# Patient Record
(demographics unavailable — no encounter records)

---

## 2024-10-16 NOTE — ASSESSMENT
[FreeTextEntry1] : Acute Visit: Fibromyalgia syndrome, Arthralgia  BP is stable. Continue current management. - Check A1c. Lipid panel, vitamin levels, urine analysis, TSH, Immunoelectrophoretic serum, Rheumatoid Factor, Protein Elect Urn, Interp, Reflx, CCP,  - Renew Pregabalin 50mg,-Discussed potential side effects including increased somnolence.  Flu vaccine- administered.  - RTO in 3 months     Pt verbalized understanding and will reach should any questions/concerns occur.

## 2024-10-16 NOTE — PHYSICAL EXAM
[Normal] : soft, non-tender, non-distended, no masses palpated, no HSM and normal bowel sounds [de-identified] : Multiple tender points throughout body, Pain in the right greater trochanter tenderness on internal rotation of the hip

## 2024-10-16 NOTE — HISTORY OF PRESENT ILLNESS
[FreeTextEntry8] : Pt is a 76 yr old female who is present today for an acute visit.   Pt c/o of pain all over body (particularly top of back, left ankle, rotator cuff), a/w fibromyalgia. Pt has arthritis and previously seen rheumatologist. Reports MRI with Dr. Roman, prior to epidural injection. Pt reports losing 7 lbs yet reports needing to lose 50 lbs in order for left ankle reconstruction. Pt reports of right groin pain.  Pt reports taking Cymbalta. Discussed Lyrica, end cyclobenzaprine.  Flu vaccine, oral consent obtained, was administered. Discussed RSV at pharmacy.  Denies any CP, chest tightness or SOB. Denies any abdominal pain, urinary symptom, or change in bowel habits. Denies any fever, chills, or night sweats.

## 2024-10-16 NOTE — ADDENDUM
[FreeTextEntry1] : I, Anthony Soliz, acted as a scribe on behalf of Dr. Benoit Reyes MD, on 10/16/2024.   All medical entries made by the scribe were at my, Dr. Benoit Reyes MD, direction and personally dictated by me on 10/16/2024. I have reviewed the chart and agree that the record accurately reflects my personal performance of the history, physical exam, assessment and plan. I have also personally directed, reviewed, and agreed with the chart.

## 2024-10-16 NOTE — REVIEW OF SYSTEMS
[Negative] : Heme/Lymph [Joint Pain] : joint pain [Muscle Pain] : muscle pain [FreeTextEntry9] : Body aches a/w fibromyalgia

## 2024-12-04 NOTE — HISTORY OF PRESENT ILLNESS
[FreeTextEntry8] : Patient presents to the office due to rotator cuff tear he does have difficulty raising shoulder has difficulty raising shoulder shoulder on the forward and lateral side, will also be following with pain management for ablation of cervical stenosis.  Denies any trauma. Has lost weight on Mounjaro denies any side effects agreeable to go up on dose.

## 2024-12-04 NOTE — PHYSICAL EXAM
[Normal] : no acute distress, well nourished, well developed and well-appearing [de-identified] : Difficulty with abduction forward flexion of the shoulder pain on internal rotation

## 2024-12-04 NOTE — ASSESSMENT
[FreeTextEntry1] : Possible tendinopathy impingement,  Did advise PT name of sports medicine provided for further recommendations To increase Mounjaro patient denies any side effects. Patient presents for longitudinal management

## 2024-12-10 NOTE — PHYSICAL EXAM
[de-identified] : Constitutional: Well-nourished, well-developed, No acute distress Respiratory:  Good respiratory effort, no SOB Lymphatic: No regional lymphadenopathy, no lymphedema Psychiatric: Pleasant and normal affect, alert and oriented x3 Skin: Clean dry and intact B/L UE/LE Musculoskeletal: normal except where as noted in regional exam   [default value] Shoulder: APPEARANCE: no marked deformities, no swelling or malalignment POSITIVE TENDERNESS: supraspinatus, infraspinatus, teres minor, LH biceps, anterior and posterior capsule,  NONTENDER: AC joint ROM: limited in all directions due to pain SPECIAL TESTS: +Drop Arm, + Empty Can, +Simmons/Neers, neg Kelley's, neg Speeds, neg Apprehension, neg cross arm adduction, neg apley's scratch test Vasc: 2+ radial pulse Neuro: AIN, PIN, Ulnar nerve intact to motor, DTRs 2+/4 biceps, triceps, brachioradialis Sensation: Intact to light touch throughout B/L Elbows:  No asymmetry, malalignment, or swelling, Full ROM, 5/5 strength in flexion/ext, pronation/supination, Joints stable B/L Wrist and Hand:  No asymmetry, malalignment, or swelling, Full ROM, 5/5 strength in wrist and long finger flexion/ext, radial/ulnar deviation, Joints stable [de-identified] : The following radiographs were ordered and read by me during this patient's visit. I reviewed each radiograph in detail with the patient and discussed the findings as highlighted below.   3 views of the left shoulder were obtained today that show glenohumeral and AC joint arthrosis.

## 2024-12-10 NOTE — PHYSICAL EXAM
[de-identified] : Constitutional: Well-nourished, well-developed, No acute distress Respiratory:  Good respiratory effort, no SOB Lymphatic: No regional lymphadenopathy, no lymphedema Psychiatric: Pleasant and normal affect, alert and oriented x3 Skin: Clean dry and intact B/L UE/LE Musculoskeletal: normal except where as noted in regional exam   [default value] Shoulder: APPEARANCE: no marked deformities, no swelling or malalignment POSITIVE TENDERNESS: supraspinatus, infraspinatus, teres minor, LH biceps, anterior and posterior capsule,  NONTENDER: AC joint ROM: limited in all directions due to pain SPECIAL TESTS: +Drop Arm, + Empty Can, +Simmons/Neers, neg Kelley's, neg Speeds, neg Apprehension, neg cross arm adduction, neg apley's scratch test Vasc: 2+ radial pulse Neuro: AIN, PIN, Ulnar nerve intact to motor, DTRs 2+/4 biceps, triceps, brachioradialis Sensation: Intact to light touch throughout B/L Elbows:  No asymmetry, malalignment, or swelling, Full ROM, 5/5 strength in flexion/ext, pronation/supination, Joints stable B/L Wrist and Hand:  No asymmetry, malalignment, or swelling, Full ROM, 5/5 strength in wrist and long finger flexion/ext, radial/ulnar deviation, Joints stable [de-identified] : The following radiographs were ordered and read by me during this patient's visit. I reviewed each radiograph in detail with the patient and discussed the findings as highlighted below.   3 views of the left shoulder were obtained today that show glenohumeral and AC joint arthrosis.

## 2024-12-10 NOTE — HISTORY OF PRESENT ILLNESS
[de-identified] : MALKA   is a 76 year F who presents with left shoulder pain.  Pain is primarily located at the posterior shoulder.  It began at least 5 years ago. She notes pain was exacerbated 6 months ago after lifting something heavy. Pain is described as throbbing in nature and limited ROM. Notes history of cervical spine stenosis, states she has had c-spine epidurals within the last few weeks. Denies numbness, tingling, weakness of the upper extremities.

## 2024-12-10 NOTE — HISTORY OF PRESENT ILLNESS
[de-identified] : MALKA   is a 76 year F who presents with left shoulder pain.  Pain is primarily located at the posterior shoulder.  It began at least 5 years ago. She notes pain was exacerbated 6 months ago after lifting something heavy. Pain is described as throbbing in nature and limited ROM. Notes history of cervical spine stenosis, states she has had c-spine epidurals within the last few weeks. Denies numbness, tingling, weakness of the upper extremities.

## 2024-12-10 NOTE — DISCUSSION/SUMMARY
[de-identified] : Radha presents with diffuse shoulder pain. Educated patient on their most probable diagnosis of rotator cuff tendinopathy and glenohumeral joint osteoarthritis.   Subacromial steroid injection performed today. Patient tolerated the procedure well. I informed the patient that the numbing medicine in today's injection will last for about 4-6 hours. The steroid that was injected will start to work in 1 to 2 days, peak at 1-2 weeks, and may last up to 4-6 weeks.  Pain may worsen over the next 72 hours. Patient was also informed they may notice local skin depigmentation, which is normal.   Referral to physical therapy provided. Follow up as needed.  Keri Brown MD, [facility] Department of Orthopedics   IRosita, assisted with documentation on 12/10/2024 acting as scribe for Dr. Keri Brown.   I, Keri Brown MD, EdMARLO, personally performed the services described in the documentation, reviewed the documentation recorded by the scribe in my presence and it accurately and completely records my words and actions.

## 2024-12-10 NOTE — PROCEDURE
[de-identified] : Injection: LEFT Shoulder Subacromial Space. Indication: Impingement. A discussion was had with the patient regarding this procedure and all questions were answered. All risks, benefits and alternatives were discussed. These included but were not limited to bleeding, infection, and allergic reaction.  A timeout was done to ensure correct side and pt agreed to the procedure.   Chlorhexidine was used to clean and sterilize and prep the area in the posterior aspect of the shoulder. A 22-gauge 1.5" needle was used to inject 4cc of 1% lidocaine without epinephrine and 1cc of 40mg/ml methylprednisolone into the subacromial space. A sterile bandage was then applied. The patient tolerated the procedure well and there were no complications.

## 2024-12-10 NOTE — PROCEDURE
[de-identified] : Injection: LEFT Shoulder Subacromial Space. Indication: Impingement. A discussion was had with the patient regarding this procedure and all questions were answered. All risks, benefits and alternatives were discussed. These included but were not limited to bleeding, infection, and allergic reaction.  A timeout was done to ensure correct side and pt agreed to the procedure.   Chlorhexidine was used to clean and sterilize and prep the area in the posterior aspect of the shoulder. A 22-gauge 1.5" needle was used to inject 4cc of 1% lidocaine without epinephrine and 1cc of 40mg/ml methylprednisolone into the subacromial space. A sterile bandage was then applied. The patient tolerated the procedure well and there were no complications.

## 2024-12-10 NOTE — DISCUSSION/SUMMARY
[de-identified] : Radha presents with diffuse shoulder pain. Educated patient on their most probable diagnosis of rotator cuff tendinopathy and glenohumeral joint osteoarthritis.   Subacromial steroid injection performed today. Patient tolerated the procedure well. I informed the patient that the numbing medicine in today's injection will last for about 4-6 hours. The steroid that was injected will start to work in 1 to 2 days, peak at 1-2 weeks, and may last up to 4-6 weeks.  Pain may worsen over the next 72 hours. Patient was also informed they may notice local skin depigmentation, which is normal.   Referral to physical therapy provided. Follow up as needed.  Keri Brown MD, [facility] Department of Orthopedics   IRosita, assisted with documentation on 12/10/2024 acting as scribe for Dr. Keri Brown.   I, Keri Brown MD, EdMARLO, personally performed the services described in the documentation, reviewed the documentation recorded by the scribe in my presence and it accurately and completely records my words and actions.

## 2024-12-12 NOTE — PHYSICAL EXAM
[2+] : left foot dorsalis pedis 2+ [Skin Turgor] : normal skin turgor [Skin Lesions] : no skin lesions [Sensation] : the sensory exam was normal to light touch and pinprick [No Focal Deficits] : no focal deficits [Deep Tendon Reflexes (DTR)] : deep tendon reflexes were 2+ and symmetric [Motor Exam] : the motor exam was normal [General Appearance - Alert] : alert [Oriented To Time, Place, And Person] : oriented to person, place, and time [Ankle Swelling (On Exam)] : not present [Varicose Veins Of Lower Extremities] : not present [] : not present [Delayed in the Right Toes] : capillary refills normal in right toes [Delayed in the Left Toes] : capillary refills normal in the left toes [de-identified] : There is collapse of the medial, longitudinal arch of the left foot.  Patient has a posterior tibial tendon dysfunction and progressive collapsing foot deformity on the left that is semi-rigid with abduction of the forefoot and forefoot varus as well as heel valgus.   No crepitation on ROM.  There is sinus tarsi pain.  She is wearing a brace.  Patient wants to consider surgery for this in the future and when she decides, she will come back for evaluation and x-rays.   [Foot Ulcer] : no foot ulcer [Skin Induration] : no skin induration [FreeTextEntry1] : Paronychia, posterior, medial nailbed, right hallux nail.

## 2024-12-12 NOTE — PHYSICAL EXAM
[2+] : left foot dorsalis pedis 2+ [Skin Turgor] : normal skin turgor [Skin Lesions] : no skin lesions [Sensation] : the sensory exam was normal to light touch and pinprick [No Focal Deficits] : no focal deficits [Deep Tendon Reflexes (DTR)] : deep tendon reflexes were 2+ and symmetric [Motor Exam] : the motor exam was normal [General Appearance - Alert] : alert [Oriented To Time, Place, And Person] : oriented to person, place, and time [Ankle Swelling (On Exam)] : not present [Varicose Veins Of Lower Extremities] : not present [] : not present [Delayed in the Right Toes] : capillary refills normal in right toes [Delayed in the Left Toes] : capillary refills normal in the left toes [de-identified] : There is collapse of the medial, longitudinal arch of the left foot.  Patient has a posterior tibial tendon dysfunction and progressive collapsing foot deformity on the left that is semi-rigid with abduction of the forefoot and forefoot varus as well as heel valgus.   No crepitation on ROM.  There is sinus tarsi pain.  She is wearing a brace.  Patient wants to consider surgery for this in the future and when she decides, she will come back for evaluation and x-rays.   [Foot Ulcer] : no foot ulcer [Skin Induration] : no skin induration [FreeTextEntry1] : Paronychia, posterior, medial nailbed, right hallux nail.

## 2024-12-12 NOTE — ASSESSMENT
[FreeTextEntry1] : Impression: Paronychia, right hallux (L03.031).  Posterior tibial tendonitis/dysfunction left lower extremity (M76.822) (M76.829).  Treatment: Patient presented for care of the paronychia today and was given Augmentin.  Patient was given soaking instructions.  Explained to the patient that because this is at the posterior nail fold, it is more difficult to treat and I would have to surgically remove the nail.  Right now, will attempt antibiotics and soaking.  Any questions or problems, patient is to contact the office.

## 2024-12-12 NOTE — CONSULT LETTER
[Dear  ___] : Dear  [unfilled], [Courtesy Letter:] : I had the pleasure of seeing your patient, [unfilled], in my office today. [Please see my note below.] : Please see my note below. [Consult Closing:] : Thank you very much for allowing me to participate in the care of this patient.  If you have any questions, please do not hesitate to contact me. [Sincerely,] : Sincerely, [FreeTextEntry2] : Benoit Reyes MD [address] [facility] [address] [place] [id number]  [FreeTextEntry3] : Charles M. Lombardi, DPM, FACF[employer] Systems Chief, Podiatric Services [facility] Assistant Professor of Surgery [facility] [employer] at [facility]

## 2024-12-12 NOTE — CONSULT LETTER
[Dear  ___] : Dear  [unfilled], [Courtesy Letter:] : I had the pleasure of seeing your patient, [unfilled], in my office today. [Please see my note below.] : Please see my note below. [Consult Closing:] : Thank you very much for allowing me to participate in the care of this patient.  If you have any questions, please do not hesitate to contact me. [Sincerely,] : Sincerely, [FreeTextEntry2] : Benoit Reyes MD [address] [facility] [address] [place] [phone]  [FreeTextEntry3] : Charles M. Lombardi, DPM, FACF[employer] Systems Chief, Podiatric Services [facility] Assistant Professor of Surgery [facility] [employer] at [facility]

## 2024-12-12 NOTE — HISTORY OF PRESENT ILLNESS
[FreeTextEntry1] : Patient presents today with paronychia to the right hallux nail with local erythema along the posterior nail fold.  She recently had a pedicure and developed a paronychia.  She is here for evaluation.  No new changes to her medical status.

## 2024-12-12 NOTE — CONSULT LETTER
[Dear  ___] : Dear  [unfilled], [Courtesy Letter:] : I had the pleasure of seeing your patient, [unfilled], in my office today. [Please see my note below.] : Please see my note below. [Consult Closing:] : Thank you very much for allowing me to participate in the care of this patient.  If you have any questions, please do not hesitate to contact me. [Sincerely,] : Sincerely, [FreeTextEntry2] : Benoit Reyes MD [address] [facility] [address] [place] [id number]  [FreeTextEntry3] : Charles M. Lombardi, DPM, FAC[employer] Chief, Podiatric Services [facility] Assistant Professor of Surgery [facility] [employer] at [facility]

## 2025-01-08 NOTE — PROCEDURE
[Partial Nail Avulsion] : partial nail avulsion on [1] : toe 1 [Medial Border] : medial nail border [Right Foot] : was performed on the right foot [Patient] : the patient [Risks] : risks [Benefits] : benefits [Alternatives] : alternatives [1%] : 1%  [Alcohol] : alcohol [27 gauge] : A 27 gauge needle was used [Tolerated Well] : tolerated the procedure well [No Complications] : There were no complications. [Instructions Given] : given handouts/patient instructions [Patient Instructed to Call] : instructed to call if redness at site, a decrease in range of motion or an increase in pain is noted after procedure. [FreeTextEntry1] : X-rays were taken to evaluate the foot and ankle. (2 views - left ankle) There is some mild arthrosis of the ankle.  No significant varus or valgus.   (2 views - left foot) Complete collapse of the medial, longitudinal arch.  Decreased calcaneal pitch.  Talar declination is increased with arthritic changes of the midtarsal joint and subtalar joint.  Forefoot varus.

## 2025-01-08 NOTE — PHYSICAL EXAM
[2+] : left foot dorsalis pedis 2+ [Skin Turgor] : normal skin turgor [Sensation] : the sensory exam was normal to light touch and pinprick [No Focal Deficits] : no focal deficits [Deep Tendon Reflexes (DTR)] : deep tendon reflexes were 2+ and symmetric [Motor Exam] : the motor exam was normal [General Appearance - Alert] : alert [Oriented To Time, Place, And Person] : oriented to person, place, and time [Ankle Swelling (On Exam)] : not present [Varicose Veins Of Lower Extremities] : not present [] : not present [Delayed in the Right Toes] : capillary refills normal in right toes [Delayed in the Left Toes] : capillary refills normal in the left toes [de-identified] : Collapse of the medial, longitudinal arch, left foot.  Abduction of the foot.  Pain along the talonavicular joint and subtalar joint.  There is adequate motion of the ankle with no crepitation.  She is unable to get up on a straight leg heel raise of the left lower extremity.  The foot is abducted and collapsed. Forefoot varus, semi-rigid deformity with heel valgus and pain within the sinus tarsi.   [Foot Ulcer] : no foot ulcer [Skin Induration] : no skin induration [FreeTextEntry1] : Paronychia, right hallux nail, tibial border.

## 2025-01-08 NOTE — HISTORY OF PRESENT ILLNESS
[FreeTextEntry1] : Patient presents today for reevaluation of paronychia, right hallux nail.  There appears to be an ingrown portion of the nail along the tibial border that is causing pain.  There is still some local erythema around the area.   She is also complaining of progressive collapse foot deformity of the left foot where she has a semi-rigid deformity of the rearfoot including the subtalar joint and midtarsal joint.  There is pain within the sinus tarsi.  No other changes to her medical status.

## 2025-01-08 NOTE — ASSESSMENT
[FreeTextEntry1] : Impression: Paronychia, right hallux nail, tibial border (L03.031) (L60.0).  Progressive collapsing foot deformity, left foot (M76.829).  Posterior tibial tendonitis, left foot (M76.822)  Treatment: The right hallux was anesthetized with 1% Xylocaine, plain and a straight back nail avulsion was performed to the tibial border.  Patient was given home care instructions.   For the foot deformity, discussed a double arthrodesis including a fusion of the subtalar joint and talonavicular joint, possible triple.  I feel that the ankle can be preserved.  This was explained to her including the risks, complications and alternatives.  She will decide as to whether she wants to proceed with surgery or not and get back to the office if she wishes to schedule.  Any questions or problems, patient is to contact the office.

## 2025-01-08 NOTE — PHYSICAL EXAM
[2+] : left foot dorsalis pedis 2+ [Skin Turgor] : normal skin turgor [Sensation] : the sensory exam was normal to light touch and pinprick [No Focal Deficits] : no focal deficits [Deep Tendon Reflexes (DTR)] : deep tendon reflexes were 2+ and symmetric [Motor Exam] : the motor exam was normal [General Appearance - Alert] : alert [Oriented To Time, Place, And Person] : oriented to person, place, and time [Ankle Swelling (On Exam)] : not present [Varicose Veins Of Lower Extremities] : not present [] : not present [Delayed in the Right Toes] : capillary refills normal in right toes [Delayed in the Left Toes] : capillary refills normal in the left toes [de-identified] : Collapse of the medial, longitudinal arch, left foot.  Abduction of the foot.  Pain along the talonavicular joint and subtalar joint.  There is adequate motion of the ankle with no crepitation.  She is unable to get up on a straight leg heel raise of the left lower extremity.  The foot is abducted and collapsed. Forefoot varus, semi-rigid deformity with heel valgus and pain within the sinus tarsi.   [Foot Ulcer] : no foot ulcer [Skin Induration] : no skin induration [FreeTextEntry1] : Paronychia, right hallux nail, tibial border.

## 2025-01-27 NOTE — PHYSICAL EXAM
[Normal] : no acute distress, well nourished, well developed and well-appearing [de-identified] : nodule right upper eyelid

## 2025-01-27 NOTE — ASSESSMENT
[FreeTextEntry1] : Acute visit: Obesity (BMI 35.0-39.9 without comorbidity) -BP is stable. Continue current management. - Renew Mounjaro 7.5MG/0.5ML - Order Mammogram Digital Screening    - RTO in 3 months     Pt verbalized understanding and will reach should any questions/concerns occur.

## 2025-01-27 NOTE — HISTORY OF PRESENT ILLNESS
[FreeTextEntry8] : Patient is a 76yr old female who is present today for an acute visit.  Patient reports losing weight (19lbs) with compound Mounjaro, discussed increasing dosage. Denies any side effects with weight loss medication. Discussed Mammogram referral. Pt also c/o lump on eye, f/u oculoplastics. Pt also c/o frequent headaches behind her eyes, discussed tension headaches. Recommended to take Tylenol and rest. Reports MRI for headaches done previously, everything normal.

## 2025-01-27 NOTE — ADDENDUM
[FreeTextEntry1] : I, Anthony Soliz, acted as a scribe on behalf of Dr. Benoit Reyes MD, on 01/27/2025.   All medical entries made by the scribe were at my, Dr. Benoit Reyes MD, direction and personally dictated by me on 01/27/2025. I have reviewed the chart and agree that the record accurately reflects my personal performance of the history, physical exam, assessment and plan. I have also personally directed, reviewed, and agreed with the chart.